# Patient Record
Sex: FEMALE | Race: OTHER | NOT HISPANIC OR LATINO
[De-identification: names, ages, dates, MRNs, and addresses within clinical notes are randomized per-mention and may not be internally consistent; named-entity substitution may affect disease eponyms.]

---

## 2023-07-05 ENCOUNTER — NON-APPOINTMENT (OUTPATIENT)
Age: 33
End: 2023-07-05

## 2023-07-07 PROBLEM — Z00.00 ENCOUNTER FOR PREVENTIVE HEALTH EXAMINATION: Status: ACTIVE | Noted: 2023-07-07

## 2023-07-10 ENCOUNTER — NON-APPOINTMENT (OUTPATIENT)
Age: 33
End: 2023-07-10

## 2023-07-11 ENCOUNTER — APPOINTMENT (OUTPATIENT)
Dept: OTOLARYNGOLOGY | Facility: CLINIC | Age: 33
End: 2023-07-11
Payer: COMMERCIAL

## 2023-07-11 VITALS
DIASTOLIC BLOOD PRESSURE: 86 MMHG | TEMPERATURE: 97.6 F | WEIGHT: 225 LBS | HEART RATE: 111 BPM | BODY MASS INDEX: 36.16 KG/M2 | SYSTOLIC BLOOD PRESSURE: 134 MMHG | OXYGEN SATURATION: 100 % | HEIGHT: 66 IN

## 2023-07-11 DIAGNOSIS — H81.12 BENIGN PAROXYSMAL VERTIGO, LEFT EAR: ICD-10-CM

## 2023-07-11 PROCEDURE — 99202 OFFICE O/P NEW SF 15 MIN: CPT | Mod: 25

## 2023-07-11 PROCEDURE — 95992 CANALITH REPOSITIONING PROC: CPT | Mod: LT

## 2023-07-11 NOTE — HISTORY OF PRESENT ILLNESS
[de-identified] : 33 y/o F is presenting with vertigo for the past 6 days. She woke up with this and it lasted the whole day. She describes the dizziness as if she was "floating." She went to urgent care and had Khadar Hallpike which was positive on the right and during the test she felt the room was spinning. She was diagnosed with bppv and was prescribed meclizine and they recommended to followup with an ENT. She has had episodes aggravated by positional change daily which last for a few seconds and it feels like the room is spinning. She denies changes in hearing, tinnitus, or pressure in the ears. She has never had this in the past. She denies recent uri/ COVID-19 infxn/ flu. She denies h/o migraines. She has not seen her internist for this. No FH pertinent to cc. Nonsmoker.

## 2023-07-11 NOTE — PHYSICAL EXAM
[Normal] : no nystagmus [] : [place]-Hallpike test is positive [de-identified] : left [de-identified] : gait steady

## 2023-07-11 NOTE — PROCEDURE
[Risk and Benefits Discussed] : The purpose, risks, discomforts, benefits and alternatives of the procedure have been explained to the patient including no treatment. [Same] : same as the Pre Op Dx. [] : Epley Maneuver [FreeTextEntry6] : nystagmus resolved after first iteration

## 2023-07-11 NOTE — ASSESSMENT
[FreeTextEntry1] : l bppv \par explained \par l Epley maneuver performed\par nystagmus resolved after first iteration\par rec - elevate hob 48 hrs, no etoh\par rtc 1 week

## 2023-07-20 ENCOUNTER — APPOINTMENT (OUTPATIENT)
Dept: OTOLARYNGOLOGY | Facility: CLINIC | Age: 33
End: 2023-07-20
Payer: COMMERCIAL

## 2023-07-20 VITALS
WEIGHT: 225 LBS | OXYGEN SATURATION: 99 % | HEART RATE: 97 BPM | TEMPERATURE: 98 F | BODY MASS INDEX: 36.16 KG/M2 | DIASTOLIC BLOOD PRESSURE: 89 MMHG | SYSTOLIC BLOOD PRESSURE: 125 MMHG | HEIGHT: 66 IN

## 2023-07-20 PROCEDURE — 99213 OFFICE O/P EST LOW 20 MIN: CPT

## 2023-07-20 NOTE — ASSESSMENT
[FreeTextEntry1] : vertigo, l bppv at last visit\par -s/p l Epley maneuver at last visit\par -today's [place] Hallpike was negative\par -VNG\par -MRI\par RTC with VNG and MRI to review findings

## 2023-07-20 NOTE — HISTORY OF PRESENT ILLNESS
[de-identified] : 9 day follow up visit for this 41 y/o F with vertigo which started approximately 2 weeks ago. At last visit she was found to have l bppv and l Epley maneuver was performed. She initially felt better, but still has instances of spinning vertigo with positional change.

## 2023-08-11 ENCOUNTER — APPOINTMENT (OUTPATIENT)
Dept: OTOLARYNGOLOGY | Facility: CLINIC | Age: 33
End: 2023-08-11
Payer: COMMERCIAL

## 2023-08-11 VITALS
HEART RATE: 113 BPM | DIASTOLIC BLOOD PRESSURE: 89 MMHG | SYSTOLIC BLOOD PRESSURE: 140 MMHG | OXYGEN SATURATION: 98 % | HEIGHT: 66 IN | TEMPERATURE: 98 F | BODY MASS INDEX: 36.16 KG/M2 | WEIGHT: 225 LBS

## 2023-08-11 DIAGNOSIS — R42 DIZZINESS AND GIDDINESS: ICD-10-CM

## 2023-08-11 PROCEDURE — 92540 BASIC VESTIBULAR EVALUATION: CPT

## 2023-08-11 PROCEDURE — 99213 OFFICE O/P EST LOW 20 MIN: CPT

## 2023-08-11 PROCEDURE — 92537 CALORIC VSTBLR TEST W/REC: CPT

## 2023-08-12 PROBLEM — R42 VERTIGO: Status: ACTIVE | Noted: 2023-07-20

## 2023-08-12 NOTE — DATA REVIEWED
[de-identified] : bakari nl reviewed with pt [de-identified] : rmi hi riding sscs, cerebellar tonsil ectopia but wnl, miniimal mucus retention cysts in maxillary sinuses reviewed report and images with pt

## 2023-08-12 NOTE — HISTORY OF PRESENT ILLNESS
[de-identified] : 3 week follow up exam for this 33 yo f with positional vertigo symptoms. She has had vng and mri and is here to review results. She is somewhat improved overall but continues to have symptoms. She explains she has been examined by her pmd and that there are no systemic causes.

## 2023-08-12 NOTE — ASSESSMENT
[FreeTextEntry1] : persistent dizziness mri questions possibility of sscd -explained vemp ordered rtc with vemp

## 2023-09-08 ENCOUNTER — TRANSCRIPTION ENCOUNTER (OUTPATIENT)
Age: 33
End: 2023-09-08

## 2023-09-13 ENCOUNTER — APPOINTMENT (OUTPATIENT)
Dept: OTOLARYNGOLOGY | Facility: CLINIC | Age: 33
End: 2023-09-13

## 2023-09-20 ENCOUNTER — TRANSCRIPTION ENCOUNTER (OUTPATIENT)
Age: 33
End: 2023-09-20

## 2024-06-05 ENCOUNTER — OFFICE (OUTPATIENT)
Dept: URBAN - METROPOLITAN AREA CLINIC 92 | Facility: CLINIC | Age: 34
Setting detail: OPHTHALMOLOGY
End: 2024-06-05
Payer: COMMERCIAL

## 2024-06-05 DIAGNOSIS — H47.10: ICD-10-CM

## 2024-06-05 PROCEDURE — 92020 GONIOSCOPY: CPT | Performed by: OPHTHALMOLOGY

## 2024-06-05 PROCEDURE — 92004 COMPRE OPH EXAM NEW PT 1/>: CPT | Performed by: OPHTHALMOLOGY

## 2024-06-05 PROCEDURE — 92133 CPTRZD OPH DX IMG PST SGM ON: CPT | Performed by: OPHTHALMOLOGY

## 2024-06-06 PROBLEM — H40.033 NARROW ANGLE; BOTH EYES: Status: ACTIVE | Noted: 2024-06-05

## 2024-06-06 PROBLEM — H47.10 PAPILLEDEMA, UNPSECIFIED: Status: ACTIVE | Noted: 2024-06-05

## 2024-12-03 ENCOUNTER — NON-APPOINTMENT (OUTPATIENT)
Age: 34
End: 2024-12-03